# Patient Record
Sex: FEMALE | Race: WHITE | NOT HISPANIC OR LATINO | URBAN - METROPOLITAN AREA
[De-identification: names, ages, dates, MRNs, and addresses within clinical notes are randomized per-mention and may not be internally consistent; named-entity substitution may affect disease eponyms.]

---

## 2019-01-16 ENCOUNTER — HISTORICAL (OUTPATIENT)
Dept: ADMINISTRATIVE | Facility: HOSPITAL | Age: 39
End: 2019-01-16

## 2019-01-16 ENCOUNTER — HISTORICAL (OUTPATIENT)
Dept: LAB | Facility: HOSPITAL | Age: 39
End: 2019-01-16

## 2019-01-16 LAB
HIV 1+2 AB+HIV1 P24 AG SERPL QL IA: NEGATIVE
T PALLIDUM AB SER QL: NORMAL

## 2023-10-17 DIAGNOSIS — E34.8 CYST OF PINEAL GLAND: Primary | ICD-10-CM

## 2023-10-18 ENCOUNTER — TELEPHONE (OUTPATIENT)
Dept: NEUROSURGERY | Facility: CLINIC | Age: 43
End: 2023-10-18
Payer: COMMERCIAL

## 2023-10-23 DIAGNOSIS — R51.9 NONINTRACTABLE HEADACHE, UNSPECIFIED CHRONICITY PATTERN, UNSPECIFIED HEADACHE TYPE: Primary | ICD-10-CM

## 2023-10-23 NOTE — TELEPHONE ENCOUNTER
You mentioned that you reviewed her MRI and would contact the patients family member who reached out to you with the results and that she did not need a Neurosurgeon.  Do I need to contact the patient?  BH

## 2023-10-25 ENCOUNTER — OFFICE VISIT (OUTPATIENT)
Dept: NEUROLOGY | Facility: CLINIC | Age: 43
End: 2023-10-25
Payer: COMMERCIAL

## 2023-10-25 VITALS
HEIGHT: 65 IN | SYSTOLIC BLOOD PRESSURE: 102 MMHG | DIASTOLIC BLOOD PRESSURE: 82 MMHG | BODY MASS INDEX: 27.66 KG/M2 | WEIGHT: 166 LBS | HEART RATE: 68 BPM

## 2023-10-25 DIAGNOSIS — H51.9 UNSPECIFIED DISORDER OF BINOCULAR MOVEMENT: Primary | ICD-10-CM

## 2023-10-25 DIAGNOSIS — R51.9 NONINTRACTABLE HEADACHE, UNSPECIFIED CHRONICITY PATTERN, UNSPECIFIED HEADACHE TYPE: ICD-10-CM

## 2023-10-25 DIAGNOSIS — G43.809 VESTIBULAR MIGRAINE: ICD-10-CM

## 2023-10-25 DIAGNOSIS — H54.7 UNSPECIFIED VISUAL LOSS: ICD-10-CM

## 2023-10-25 DIAGNOSIS — H93.A9 PULSATILE TINNITUS, UNSPECIFIED EAR: ICD-10-CM

## 2023-10-25 PROCEDURE — 3079F DIAST BP 80-89 MM HG: CPT | Mod: CPTII,S$GLB,, | Performed by: PSYCHIATRY & NEUROLOGY

## 2023-10-25 PROCEDURE — 99204 PR OFFICE/OUTPT VISIT, NEW, LEVL IV, 45-59 MIN: ICD-10-PCS | Mod: S$GLB,,, | Performed by: PSYCHIATRY & NEUROLOGY

## 2023-10-25 PROCEDURE — 99204 OFFICE O/P NEW MOD 45 MIN: CPT | Mod: S$GLB,,, | Performed by: PSYCHIATRY & NEUROLOGY

## 2023-10-25 PROCEDURE — 1160F PR REVIEW ALL MEDS BY PRESCRIBER/CLIN PHARMACIST DOCUMENTED: ICD-10-PCS | Mod: CPTII,S$GLB,, | Performed by: PSYCHIATRY & NEUROLOGY

## 2023-10-25 PROCEDURE — 1159F MED LIST DOCD IN RCRD: CPT | Mod: CPTII,S$GLB,, | Performed by: PSYCHIATRY & NEUROLOGY

## 2023-10-25 PROCEDURE — 1160F RVW MEDS BY RX/DR IN RCRD: CPT | Mod: CPTII,S$GLB,, | Performed by: PSYCHIATRY & NEUROLOGY

## 2023-10-25 PROCEDURE — 3074F PR MOST RECENT SYSTOLIC BLOOD PRESSURE < 130 MM HG: ICD-10-PCS | Mod: CPTII,S$GLB,, | Performed by: PSYCHIATRY & NEUROLOGY

## 2023-10-25 PROCEDURE — 1159F PR MEDICATION LIST DOCUMENTED IN MEDICAL RECORD: ICD-10-PCS | Mod: CPTII,S$GLB,, | Performed by: PSYCHIATRY & NEUROLOGY

## 2023-10-25 PROCEDURE — 3008F BODY MASS INDEX DOCD: CPT | Mod: CPTII,S$GLB,, | Performed by: PSYCHIATRY & NEUROLOGY

## 2023-10-25 PROCEDURE — 3008F PR BODY MASS INDEX (BMI) DOCUMENTED: ICD-10-PCS | Mod: CPTII,S$GLB,, | Performed by: PSYCHIATRY & NEUROLOGY

## 2023-10-25 PROCEDURE — 99999 PR PBB SHADOW E&M-EST. PATIENT-LVL III: CPT | Mod: PBBFAC,,, | Performed by: PSYCHIATRY & NEUROLOGY

## 2023-10-25 PROCEDURE — 99999 PR PBB SHADOW E&M-EST. PATIENT-LVL III: ICD-10-PCS | Mod: PBBFAC,,, | Performed by: PSYCHIATRY & NEUROLOGY

## 2023-10-25 PROCEDURE — 3079F PR MOST RECENT DIASTOLIC BLOOD PRESSURE 80-89 MM HG: ICD-10-PCS | Mod: CPTII,S$GLB,, | Performed by: PSYCHIATRY & NEUROLOGY

## 2023-10-25 PROCEDURE — 3074F SYST BP LT 130 MM HG: CPT | Mod: CPTII,S$GLB,, | Performed by: PSYCHIATRY & NEUROLOGY

## 2023-10-25 RX ORDER — NORGESTIMATE AND ETHINYL ESTRADIOL 7DAYSX3 LO
1 KIT ORAL DAILY
COMMUNITY
Start: 2023-10-10

## 2023-10-25 NOTE — PROGRESS NOTES
Chief Complaint   Patient presents with    Headache     NP: referred by Dr. Caridad Bell for neuro consult to evaluate for headaches: Stated having headaches on  to Oct 23 started having vision loss with constant dizziness and heaviness to whole left side of head. Hears a whooshing sound and ringing in ears. Dizziness is much better. Does get nauseated.  Occasionally gets dizziness only while walking.        This is a 43 y.o. female  here for headaches, vision changes.  [place] on  when she woke up from a nap and had loss of vision lasting for about 5-7 minutes.  She felt dizzy, almost like a sea sickness.  The dizziness has persisted for the past 2 weeks although it is getting better.  After having the episode of vision changes she did have a left-sided head pressure which has persisted also for the past 2 weeks but again getting better.  Pain was never severe in the head.  On 1 occasion while at school in a meeting noted visual noise in a feeling that she had to block out peripheral vision in order to focus.  She does have a whooshing sound in her left ear from time to time.  Notably she does have a history of migraines in her 20s which resolved and she is not had 1 in several years.  She also has a remote history of Meniere's disease which again resolved.  She is on OCP for many years. Saw eye doctor who noted no optic nerve abnormalities.     MRI brain normal        Medication List with Changes/Refills   Current Medications    NORGESTIMATE-ETHINYL ESTRADIOL (ORTHO TRI-CYCLEN LO) 0.18/0.215/0.25 MG-25 MCG TABLET    Take 1 tablet by mouth once daily.        Past Surgical History:   Procedure Laterality Date     SECTION, CLASSIC      REDUCTION OF BOTH BREASTS          History reviewed. No pertinent past medical history.     Family History   Problem Relation Age of Onset    Breast cancer Mother     Lymphoma Mother     Melanoma Father         Social  History     Socioeconomic History    Marital status: Unknown   Tobacco Use    Smoking status: Never    Smokeless tobacco: Never   Substance and Sexual Activity    Alcohol use: Yes     Comment: Occasionally    Drug use: Never          Review of Systems  Review of Systems   Constitutional: Negative for appetite change.   HENT: Negative for sinus pressure and sore throat.    Eyes: Negative for visual disturbance.   Respiratory: Negative for cough and shortness of breath.    Cardiovascular: Negative for chest pain.   Gastrointestinal: Negative for diarrhea and nausea.   Endocrine: Negative for cold intolerance and heat intolerance.   Genitourinary: Negative for dysuria.   Musculoskeletal: Negative for arthralgias and myalgias.   Skin: Negative for rash.   Allergic/Immunologic: Negative for immunocompromised state.   Neurological:        See HPI   Hematological: Does not bruise/bleed easily.   Psychiatric/Behavioral: Negative for hallucinations.      General: alert and oriented, no acute distress, no audible wheezes, pulse intact, no edema    Vitals:    10/25/23 0922   BP: 102/82   Pulse: 68        Cognition and Comprehension  Speech and language intact  Follows commands  Speech fluent  Attention intact  Memory for recent events intact from history taking  Affect pleasant  Fund of knowledge adequate    Cranial nerves  II. Optic: Visual fields full to confrontation both eyes  III, IV, VI. Oculomotor: Intact, Pupils equal, round and reactive to light, no nystagmus  V. Trigeminal: sensation to light touch normal  VII. No facial asymmetry or facial weakness  VIII. Hearing intact to spoken voice  IX/X. Glossopharyngeal/Vagus: Voice normal, palate rises symmetrically  XI. Axillary: Shoulder shrug normal  XII. Hypoglossal: Intact    Muscle Strength and Tone  Normal upper extremity tone  Normal lower extremity tone  Normal upper extremity strength  Normal lower extremity strength    Sensation  Intact to light touch and  temperature    Reflexes  Normal and symmetric    Coordination and Gait  Finger to nose normal  Gait normal       Annabelle was seen today for headache.    Diagnoses and all orders for this visit:    Unspecified disorder of binocular movement  -     MRA Brain without contrast; Future    Nonintractable headache, unspecified chronicity pattern, unspecified headache type  -     Ambulatory referral/consult to Neurology    Unspecified visual loss  -     MRA Brain without contrast; Future    Pulsatile tinnitus, unspecified ear  -     MRV Brain Without Contrast; Future    Vestibular migraine     Most likely vestibular migraine, but would want to rule out CSVT. MRA/MRV, sx improving so no treatment recommended at this time

## 2023-11-02 ENCOUNTER — TELEPHONE (OUTPATIENT)
Dept: NEUROLOGY | Facility: CLINIC | Age: 43
End: 2023-11-02
Payer: COMMERCIAL

## 2023-11-02 NOTE — TELEPHONE ENCOUNTER
----- Message from Martina Green MD sent at 11/2/2023  8:21 AM CDT -----  Please let patient know MRA is normal.

## 2023-11-02 NOTE — TELEPHONE ENCOUNTER
----- Message from Martina Green MD sent at 11/2/2023  8:22 AM CDT -----  Please let patient know MRV is normal.

## 2025-04-14 NOTE — TELEPHONE ENCOUNTER
Results of MRA brain given to patient   [Follow-Up: _____] : a [unfilled] follow-up visit [FreeTextEntry1] : joint pains